# Patient Record
Sex: FEMALE | Race: BLACK OR AFRICAN AMERICAN | NOT HISPANIC OR LATINO
[De-identification: names, ages, dates, MRNs, and addresses within clinical notes are randomized per-mention and may not be internally consistent; named-entity substitution may affect disease eponyms.]

---

## 2019-02-26 PROBLEM — Z00.00 ENCOUNTER FOR PREVENTIVE HEALTH EXAMINATION: Status: ACTIVE | Noted: 2019-02-26

## 2019-03-20 ENCOUNTER — APPOINTMENT (OUTPATIENT)
Dept: BREAST CENTER | Facility: CLINIC | Age: 74
End: 2019-03-20

## 2019-03-20 ENCOUNTER — APPOINTMENT (OUTPATIENT)
Dept: BREAST CENTER | Facility: CLINIC | Age: 74
End: 2019-03-20
Payer: MEDICARE

## 2019-03-20 VITALS
BODY MASS INDEX: 37.89 KG/M2 | WEIGHT: 250 LBS | SYSTOLIC BLOOD PRESSURE: 117 MMHG | HEIGHT: 68 IN | HEART RATE: 56 BPM | DIASTOLIC BLOOD PRESSURE: 68 MMHG

## 2019-03-20 DIAGNOSIS — N60.12 DIFFUSE CYSTIC MASTOPATHY OF RIGHT BREAST: ICD-10-CM

## 2019-03-20 DIAGNOSIS — Z95.0 PRESENCE OF CARDIAC PACEMAKER: ICD-10-CM

## 2019-03-20 DIAGNOSIS — Z80.3 FAMILY HISTORY OF MALIGNANT NEOPLASM OF BREAST: ICD-10-CM

## 2019-03-20 DIAGNOSIS — Z83.79 FAMILY HISTORY OF OTHER DISEASES OF THE DIGESTIVE SYSTEM: ICD-10-CM

## 2019-03-20 DIAGNOSIS — Z12.31 ENCOUNTER FOR SCREENING MAMMOGRAM FOR MALIGNANT NEOPLASM OF BREAST: ICD-10-CM

## 2019-03-20 DIAGNOSIS — Z83.3 FAMILY HISTORY OF DIABETES MELLITUS: ICD-10-CM

## 2019-03-20 DIAGNOSIS — Z86.39 PERSONAL HISTORY OF OTHER ENDOCRINE, NUTRITIONAL AND METABOLIC DISEASE: ICD-10-CM

## 2019-03-20 DIAGNOSIS — N60.11 DIFFUSE CYSTIC MASTOPATHY OF RIGHT BREAST: ICD-10-CM

## 2019-03-20 DIAGNOSIS — Z82.49 FAMILY HISTORY OF ISCHEMIC HEART DISEASE AND OTHER DISEASES OF THE CIRCULATORY SYSTEM: ICD-10-CM

## 2019-03-20 DIAGNOSIS — Z78.9 OTHER SPECIFIED HEALTH STATUS: ICD-10-CM

## 2019-03-20 DIAGNOSIS — Z87.891 PERSONAL HISTORY OF NICOTINE DEPENDENCE: ICD-10-CM

## 2019-03-20 PROCEDURE — 99205 OFFICE O/P NEW HI 60 MIN: CPT

## 2019-03-20 NOTE — REVIEW OF SYSTEMS
[Eyesight Problems] : eyesight problems [Shortness Of Breath] : shortness of breath [Joint Pain] : joint pain [Itching] : itching [Sleep Disturbances] : sleep disturbances [Negative] : Heme/Lymph

## 2019-04-12 NOTE — ASSESSMENT
[FreeTextEntry1] : patient referred to dermatology\par recommend moisturizer daily\par due for bilateral mg/sono FEB 2020\par f/u with me after\par She knows to call or return sooner should any concerns or questions arise.\par

## 2019-04-12 NOTE — PHYSICAL EXAM
[Normocephalic] : normocephalic [Atraumatic] : atraumatic [No Supraclavicular Adenopathy] : no supraclavicular adenopathy [Supple] : supple [Examined in the supine and seated position] : examined in the supine and seated position [No dominant masses] : no dominant masses in right breast  [No dominant masses] : no dominant masses left breast [No Nipple Retraction] : no left nipple retraction [No Nipple Discharge] : no left nipple discharge [No Axillary Lymphadenopathy] : no left axillary lymphadenopathy [No Edema] : no edema [de-identified] : chest rash with ulceration L>R, no collections, no masses

## 2019-04-12 NOTE — CONSULT LETTER
[Dear  ___] : Dear  [unfilled], [Consult Letter:] : I had the pleasure of evaluating your patient, [unfilled]. [Please see my note below.] : Please see my note below. [Sincerely,] : Sincerely, [Consult Closing:] : Thank you very much for allowing me to participate in the care of this patient.  If you have any questions, please do not hesitate to contact me. [FreeTextEntry3] : Enedelia Bella MS DO\par Breast Surgeon\par [facility] \[place] [address]\par

## 2024-09-30 ENCOUNTER — OFFICE (OUTPATIENT)
Facility: LOCATION | Age: 79
Setting detail: OPHTHALMOLOGY
End: 2024-09-30
Payer: COMMERCIAL

## 2024-09-30 DIAGNOSIS — H16.223: ICD-10-CM

## 2024-09-30 DIAGNOSIS — H43.813: ICD-10-CM

## 2024-09-30 DIAGNOSIS — H25.12: ICD-10-CM

## 2024-09-30 DIAGNOSIS — H35.3132: ICD-10-CM

## 2024-09-30 PROBLEM — H40.013 GLAUCOMA SUSPECT, LOW RISK 1-2 FACTORS; BOTH EYES: Status: ACTIVE | Noted: 2024-09-30

## 2024-09-30 PROBLEM — E11.9 DIABETES TYPE 2 NO RETINOPATHY: Status: ACTIVE | Noted: 2024-09-30

## 2024-09-30 PROBLEM — H05.243: Status: ACTIVE | Noted: 2024-09-30

## 2024-09-30 PROCEDURE — 92012 INTRM OPH EXAM EST PATIENT: CPT | Performed by: OPTOMETRIST

## 2024-09-30 PROCEDURE — 92134 CPTRZ OPH DX IMG PST SGM RTA: CPT | Performed by: OPTOMETRIST

## 2024-09-30 ASSESSMENT — CONFRONTATIONAL VISUAL FIELD TEST (CVF)
OS_FINDINGS: FULL
OD_FINDINGS: FULL

## 2024-12-02 ENCOUNTER — RX ONLY (RX ONLY)
Age: 79
End: 2024-12-02

## 2024-12-02 ENCOUNTER — OFFICE (OUTPATIENT)
Facility: LOCATION | Age: 79
Setting detail: OPHTHALMOLOGY
End: 2024-12-02
Payer: COMMERCIAL

## 2024-12-02 DIAGNOSIS — H05.243: ICD-10-CM

## 2024-12-02 DIAGNOSIS — H40.013: ICD-10-CM

## 2024-12-02 DIAGNOSIS — H25.12: ICD-10-CM

## 2024-12-02 DIAGNOSIS — H16.223: ICD-10-CM

## 2024-12-02 PROCEDURE — 92133 CPTRZD OPH DX IMG PST SGM ON: CPT | Performed by: OPHTHALMOLOGY

## 2024-12-02 PROCEDURE — 99213 OFFICE O/P EST LOW 20 MIN: CPT | Performed by: OPHTHALMOLOGY

## 2024-12-02 ASSESSMENT — REFRACTION_AUTOREFRACTION
OS_CYLINDER: -0.75
OS_AXIS: 095
OD_SPHERE: -0.25
OD_CYLINDER: SPH
OS_SPHERE: +1.50

## 2024-12-02 ASSESSMENT — REFRACTION_MANIFEST
OD_VA1: 20/25-1
OD_CYLINDER: SPH
OS_AXIS: 090
OD_SPHERE: -0.25
OS_CYLINDER: -0.75
OS_AXIS: 095
OS_VA1: 20/40+2
OD_CYLINDER: SPH
OS_CYLINDER: -0.50
OS_SPHERE: +4.00
OS_SPHERE: +1.50
OD_SPHERE: +2.75

## 2024-12-02 ASSESSMENT — PACHYMETRY
OD_CT_CORRECTION: -2
OS_CT_UM: 601
OD_CT_UM: 578
OS_CT_CORRECTION: -4

## 2024-12-02 ASSESSMENT — TONOMETRY
OD_IOP_MMHG: 16
OS_IOP_MMHG: 17

## 2024-12-02 ASSESSMENT — TEAR BREAK UP TIME (TBUT)
OS_TBUT: 3+
OD_TBUT: 3+

## 2024-12-02 ASSESSMENT — KERATOMETRY
OS_K2POWER_DIOPTERS: 42.00
OD_K1POWER_DIOPTERS: 41.75
OS_K1POWER_DIOPTERS: 41.75
OS_AXISANGLE_DEGREES: 090
OD_K2POWER_DIOPTERS: 42.25
OD_AXISANGLE_DEGREES: 100

## 2024-12-02 ASSESSMENT — VISUAL ACUITY
OD_BCVA: 20/40-2
OS_BCVA: 20/30-2

## 2025-04-07 ENCOUNTER — RX ONLY (RX ONLY)
Age: 80
End: 2025-04-07

## 2025-04-07 ENCOUNTER — OFFICE (OUTPATIENT)
Facility: LOCATION | Age: 80
Setting detail: OPHTHALMOLOGY
End: 2025-04-07
Payer: COMMERCIAL

## 2025-04-07 DIAGNOSIS — H40.013: ICD-10-CM

## 2025-04-07 DIAGNOSIS — H16.223: ICD-10-CM

## 2025-04-07 DIAGNOSIS — H25.12: ICD-10-CM

## 2025-04-07 DIAGNOSIS — H05.243: ICD-10-CM

## 2025-04-07 DIAGNOSIS — H25.13: ICD-10-CM

## 2025-04-07 PROCEDURE — 92136 OPHTHALMIC BIOMETRY: CPT | Mod: LT | Performed by: OPHTHALMOLOGY

## 2025-04-07 PROCEDURE — 99214 OFFICE O/P EST MOD 30 MIN: CPT | Performed by: OPHTHALMOLOGY

## 2025-04-07 PROCEDURE — 92250 FUNDUS PHOTOGRAPHY W/I&R: CPT | Performed by: OPHTHALMOLOGY

## 2025-04-07 PROCEDURE — 92083 EXTENDED VISUAL FIELD XM: CPT | Performed by: OPHTHALMOLOGY

## 2025-04-07 ASSESSMENT — PACHYMETRY
OS_CT_UM: 601
OD_CT_CORRECTION: -2
OD_CT_UM: 578
OS_CT_CORRECTION: -4

## 2025-04-07 ASSESSMENT — REFRACTION_MANIFEST
OS_VA1: 20/40-2
OD_CYLINDER: -0.50
OD_SPHERE: PL
OD_VA1: 20/25-3
OD_VA1: 20/25-3
OD_AXIS: 075
OS_CYLINDER: -0.50
OS_AXIS: 090
OD_AXIS: 090
OS_SPHERE: +0.75
OS_VA1: 20/40-2
OD_CYLINDER: -0.50
OS_AXIS: 100
OS_CYLINDER: -0.50
OS_SPHERE: +0.75
OD_SPHERE: PL

## 2025-04-07 ASSESSMENT — KERATOMETRY
OS_K1K2_AVERAGE: 41.875
OD_K1POWER_DIOPTERS: 41.75
OS_K1POWER_DIOPTERS: 41.75
OD_AXISANGLE_DEGREES: 105
OS_AXISANGLE_DEGREES: 100
OS_CYLPOWER_DEGREES: 0.25
OS_K1POWER_DIOPTERS: 41.75
OD_CYLAXISANGLE_DEGREES: 105
OD_K1K2_AVERAGE: 42
OD_AXISANGLE_DEGREES: 15
OS_K2POWER_DIOPTERS: 42.00
OS_K2POWER_DIOPTERS: 42.00
OD_K1POWER_DIOPTERS: 41.75
OD_AXISANGLE2_DEGREES: 105
OS_AXISANGLE2_DEGREES: 100
OD_CYLPOWER_DEGREES: 0.5
OD_K2POWER_DIOPTERS: 42.25
OD_K2POWER_DIOPTERS: 42.25
OS_CYLAXISANGLE_DEGREES: 100
OS_AXISANGLE_DEGREES: 10

## 2025-04-07 ASSESSMENT — REFRACTION_AUTOREFRACTION
OD_CYLINDER: -0.50
OD_SPHERE: PL
OS_CYLINDER: -0.50
OS_AXIS: 100
OS_SPHERE: +0.75
OD_AXIS: 075

## 2025-04-07 ASSESSMENT — TEAR BREAK UP TIME (TBUT)
OS_TBUT: 3+
OD_TBUT: 3+

## 2025-04-07 ASSESSMENT — VISUAL ACUITY
OD_BCVA: 20/40-2
OS_BCVA: 20/25-2

## 2025-04-07 ASSESSMENT — TONOMETRY: OD_IOP_MMHG: 18
